# Patient Record
Sex: MALE | NOT HISPANIC OR LATINO
[De-identification: names, ages, dates, MRNs, and addresses within clinical notes are randomized per-mention and may not be internally consistent; named-entity substitution may affect disease eponyms.]

---

## 2019-07-17 PROBLEM — Z00.00 ENCOUNTER FOR PREVENTIVE HEALTH EXAMINATION: Status: ACTIVE | Noted: 2019-07-17

## 2019-07-30 ENCOUNTER — APPOINTMENT (OUTPATIENT)
Dept: OTOLARYNGOLOGY | Facility: CLINIC | Age: 54
End: 2019-07-30
Payer: MEDICARE

## 2019-07-30 PROCEDURE — 69210 REMOVE IMPACTED EAR WAX UNI: CPT

## 2019-07-30 PROCEDURE — 99213 OFFICE O/P EST LOW 20 MIN: CPT | Mod: 25

## 2019-07-30 RX ORDER — METFORMIN HYDROCHLORIDE 1000 MG/1
1000 TABLET, COATED ORAL
Refills: 0 | Status: ACTIVE | COMMUNITY

## 2019-07-30 NOTE — HISTORY OF PRESENT ILLNESS
[de-identified] : Patient with history of Down syndrome, chronic rhinitis, stenotic external canals and recurrent cerumen impactions, status post left mastoidectomy.  Has been well but his caregiver reports that he is not hearing very well currently. Also has mild-mod high-frequency sensorineural hearing loss which we are monitoring.

## 2019-07-30 NOTE — ASSESSMENT
[FreeTextEntry1] : Cerumen impaction, now resolved.  The patient was counseled in aural hygiene and Qtip avoidance.  I offered an audiogram, but the patient refused.\par \par FU 3 months

## 2019-12-03 ENCOUNTER — APPOINTMENT (OUTPATIENT)
Dept: OTOLARYNGOLOGY | Facility: CLINIC | Age: 54
End: 2019-12-03
Payer: MEDICARE

## 2019-12-03 VITALS — HEART RATE: 85 BPM | SYSTOLIC BLOOD PRESSURE: 113 MMHG | DIASTOLIC BLOOD PRESSURE: 59 MMHG

## 2019-12-03 PROCEDURE — 92567 TYMPANOMETRY: CPT

## 2019-12-03 PROCEDURE — 69210 REMOVE IMPACTED EAR WAX UNI: CPT

## 2019-12-03 PROCEDURE — 99213 OFFICE O/P EST LOW 20 MIN: CPT | Mod: 25

## 2019-12-03 PROCEDURE — 92557 COMPREHENSIVE HEARING TEST: CPT

## 2019-12-03 NOTE — HISTORY OF PRESENT ILLNESS
[de-identified] : Patient with history of Down syndrome, chronic rhinitis, stenotic external canals and recurrent cerumen impactions, status post left mastoidectomy.  Reports that his hearing significantly declined on Thanksgiving day.  he has had recent nasal congestion not on Flonase.  Known history of mild-mod high-frequency sensorineural hearing loss which we are monitoring.

## 2019-12-03 NOTE — ASSESSMENT
[FreeTextEntry1] : Cerumen impaction, now resolved.  The patient was counseled in aural hygiene and Qtip avoidance.  We discussed that his hearing has declined significantly likely from fluid due to recent upper respiratory tract infection.  His canals are quite stenotic, and I cannot visualize the tympanic membrane well. We will need to recheck this at his next visit.If his nasal symptoms worsen, could consider antibiotics and steroids, but he is on metformin for diabetes, and I would not like to blood sugars. He will use Flonase twice daily and be monitored closely for the time being. They were instructed to call if anything worsens\par \par FU 3 months, Repeat audiogram at that time

## 2020-01-21 ENCOUNTER — APPOINTMENT (OUTPATIENT)
Dept: OTOLARYNGOLOGY | Facility: CLINIC | Age: 55
End: 2020-01-21
Payer: MEDICARE

## 2020-01-21 DIAGNOSIS — H60.501 UNSPECIFIED ACUTE NONINFECTIVE OTITIS EXTERNA, RIGHT EAR: ICD-10-CM

## 2020-01-21 DIAGNOSIS — H90.3 SENSORINEURAL HEARING LOSS, BILATERAL: ICD-10-CM

## 2020-01-21 PROCEDURE — 99213 OFFICE O/P EST LOW 20 MIN: CPT | Mod: 25

## 2020-01-21 PROCEDURE — 69210 REMOVE IMPACTED EAR WAX UNI: CPT

## 2020-01-21 RX ORDER — OFLOXACIN OTIC 3 MG/ML
0.3 SOLUTION AURICULAR (OTIC) TWICE DAILY
Qty: 1 | Refills: 0 | Status: ACTIVE | COMMUNITY
Start: 2020-01-21 | End: 1900-01-01

## 2020-01-21 NOTE — ASSESSMENT
[FreeTextEntry1] : Cerumen impaction, mild right otitis externa treated with the treatment and boric acid. Recommended ofloxacin and dry ear precautions.\par \par In Nov, we discussed that his hearing had declined significantly likely from fluid due to recent upper respiratory tract infection.  His canals are quite stenotic, and I cannot visualize the tympanic membrane well. We will need to recheck this at his next visit.\par \par FU 3 months, Repeat audiogram at that time

## 2020-02-04 ENCOUNTER — APPOINTMENT (OUTPATIENT)
Dept: OTOLARYNGOLOGY | Facility: CLINIC | Age: 55
End: 2020-02-04
Payer: MEDICARE

## 2020-02-04 DIAGNOSIS — H90.A32 MIXED CONDUCTIVE AND SENSORINEURAL HEARING, UNILATERAL, LEFT EAR WITH RESTRICTED HEARING ON THE  CONTRALATERAL SIDE: ICD-10-CM

## 2020-02-04 DIAGNOSIS — H70.11 CHRONIC MASTOIDITIS, RIGHT EAR: ICD-10-CM

## 2020-02-04 PROCEDURE — 69220 CLEAN OUT MASTOID CAVITY: CPT | Mod: RT

## 2020-02-04 PROCEDURE — 99213 OFFICE O/P EST LOW 20 MIN: CPT | Mod: 25

## 2020-02-04 NOTE — HISTORY OF PRESENT ILLNESS
[de-identified] : Patient with history of Down syndrome, chronic rhinitis On Flonase, stenotic external canals and recurrent cerumen impactions, status post left mastoidectomy remotely.  Reports that his hearing significantly decline over the weekend, After having cerumen removed on January 21 His caretaker questioned possible ear infection. Noted allergy to have cerumen impaction.   \par \par Known history of mild-mod high-frequency sensorineural hearing loss which we are monitoring.  12/3/19 audiogram shows significant decline in hearing with left conductive overlay and a type B tympanogram, right side has also declined and is type C. Patient was falling asleep throughout the exam

## 2020-02-04 NOTE — ASSESSMENT
[FreeTextEntry1] : Cerumen impaction, mild right mastoiditis Recommended continuing with the ofloxacin and dry ear precautions For several days.\par \par In Nov, we discussed that his hearing had declined significantly likely from fluid due to recent upper respiratory tract infection.  His canals are quite stenotic, and I cannot visualize the tympanic membrane well. We will need to recheck this at his next visit.\par \par FU 3 months, Repeat audiogram at that time

## 2020-03-30 ENCOUNTER — APPOINTMENT (OUTPATIENT)
Dept: OTOLARYNGOLOGY | Facility: CLINIC | Age: 55
End: 2020-03-30

## 2020-05-14 ENCOUNTER — APPOINTMENT (OUTPATIENT)
Dept: OTOLARYNGOLOGY | Facility: CLINIC | Age: 55
End: 2020-05-14
Payer: MEDICARE

## 2020-05-14 PROCEDURE — 69210 REMOVE IMPACTED EAR WAX UNI: CPT

## 2020-05-14 PROCEDURE — 99213 OFFICE O/P EST LOW 20 MIN: CPT | Mod: 25

## 2020-05-18 NOTE — ASSESSMENT
[FreeTextEntry1] : Cerumen impaction, resolved, no mastoiditis\par \par In Nov, we discussed that his hearing had declined significantly likely from fluid due to recent upper respiratory tract infection.  His canals are quite stenotic, and I cannot visualize the tympanic membrane well. We will need to recheck this at his next visit.\par \par FU 3 months, Repeat audiogram at that time

## 2020-05-18 NOTE — HISTORY OF PRESENT ILLNESS
[de-identified] : Patient with history of Down syndrome, chronic rhinitis on Flonase, stenotic external canals and recurrent cerumen impactions, status post left mastoidectomy remotely.  Reports that his hearing significantly decline over the past 2 weeks while in quarantine in [facility], Denies otalgia, otorrhea  \par \par Known history of mild-mod high-frequency sensorineural hearing loss which we are monitoring.  12/3/19 audiogram shows significant decline in hearing with left conductive overlay and a type B tympanogram, right side has also declined and is type C. Patient was falling asleep throughout the exam

## 2020-06-30 ENCOUNTER — APPOINTMENT (OUTPATIENT)
Dept: OTOLARYNGOLOGY | Facility: CLINIC | Age: 55
End: 2020-06-30
Payer: MEDICARE

## 2020-06-30 DIAGNOSIS — H61.23 IMPACTED CERUMEN, BILATERAL: ICD-10-CM

## 2020-06-30 DIAGNOSIS — H69.83 OTHER SPECIFIED DISORDERS OF EUSTACHIAN TUBE, BILATERAL: ICD-10-CM

## 2020-06-30 DIAGNOSIS — H90.2 CONDUCTIVE HEARING LOSS, UNSPECIFIED: ICD-10-CM

## 2020-06-30 PROCEDURE — 99213 OFFICE O/P EST LOW 20 MIN: CPT | Mod: 24

## 2020-06-30 PROCEDURE — 92553 AUDIOMETRY AIR & BONE: CPT

## 2020-06-30 PROCEDURE — 92567 TYMPANOMETRY: CPT

## 2020-06-30 PROCEDURE — 69210 REMOVE IMPACTED EAR WAX UNI: CPT

## 2020-06-30 NOTE — HISTORY OF PRESENT ILLNESS
[de-identified] : Patient with history of Down syndrome, chronic rhinitis on Flonase, stenotic external canals and recurrent cerumen impactions, status post left mastoidectomy remotely.  Report by his aide that his hearing significantly declined recently.  Denies otalgia, otorrhea, but undergoing workup for conjunctivitis\par \par Known history of mild-mod high-frequency sensorineural hearing loss which we are monitoring.  12/3/19 audiogram shows significant decline in hearing with left conductive overlay and a type B tympanogram, right side has also declined and is type C. Patient was falling asleep throughout the exam

## 2020-06-30 NOTE — ASSESSMENT
[FreeTextEntry1] : Cerumen impaction, resolved, no mastoiditis\par \par In Nov, we discussed that his hearing had declined significantly likely from fluid due to recent upper respiratory tract infection, and has not yet cleared.  His canals are quite stenotic, and I cannot visualize the tympanic membrane well. Consider BMT vs ?BAHA?